# Patient Record
Sex: MALE | Race: WHITE | NOT HISPANIC OR LATINO | Employment: STUDENT | URBAN - METROPOLITAN AREA
[De-identification: names, ages, dates, MRNs, and addresses within clinical notes are randomized per-mention and may not be internally consistent; named-entity substitution may affect disease eponyms.]

---

## 2017-07-21 ENCOUNTER — HOSPITAL ENCOUNTER (EMERGENCY)
Facility: HOSPITAL | Age: 22
Discharge: HOME | End: 2017-07-21
Attending: EMERGENCY MEDICINE | Admitting: EMERGENCY MEDICINE
Payer: COMMERCIAL

## 2017-07-21 VITALS
TEMPERATURE: 97.6 F | SYSTOLIC BLOOD PRESSURE: 127 MMHG | DIASTOLIC BLOOD PRESSURE: 60 MMHG | HEART RATE: 60 BPM | RESPIRATION RATE: 16 BRPM | OXYGEN SATURATION: 98 % | WEIGHT: 147.49 LBS
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
## 2017-07-21 DIAGNOSIS — M79.10 MUSCLE SORENESS: ICD-10-CM

## 2017-07-21 PROCEDURE — 99283 EMERGENCY DEPT VISIT LOW MDM: CPT

## 2017-08-03 ENCOUNTER — ALLSCRIPTS OFFICE VISIT (OUTPATIENT)
Dept: OTHER | Facility: OTHER | Age: 22
End: 2017-08-03

## 2017-08-03 ENCOUNTER — TRANSCRIBE ORDERS (OUTPATIENT)
Dept: ADMINISTRATIVE | Facility: HOSPITAL | Age: 22
End: 2017-08-03

## 2017-08-03 DIAGNOSIS — S49.91XA UNSPECIFIED INJURY OF RIGHT SHOULDER AND UPPER ARM, INITIAL ENCOUNTER: ICD-10-CM

## 2017-08-03 DIAGNOSIS — S49.91XD INJURY OF RIGHT UPPER ARM, SUBSEQUENT ENCOUNTER: Primary | ICD-10-CM

## 2017-08-09 ENCOUNTER — HOSPITAL ENCOUNTER (OUTPATIENT)
Dept: RADIOLOGY | Facility: HOSPITAL | Age: 22
Discharge: HOME | End: 2017-08-09
Attending: INTERNAL MEDICINE
Payer: COMMERCIAL

## 2017-08-09 ENCOUNTER — HOSPITAL ENCOUNTER (OUTPATIENT)
Dept: MRI IMAGING | Facility: HOSPITAL | Age: 22
Discharge: HOME | End: 2017-08-09
Attending: INTERNAL MEDICINE
Payer: COMMERCIAL

## 2017-08-09 DIAGNOSIS — S49.91XA UNSPECIFIED INJURY OF RIGHT SHOULDER AND UPPER ARM, INITIAL ENCOUNTER: ICD-10-CM

## 2017-08-09 DIAGNOSIS — S49.91XD INJURY OF RIGHT UPPER ARM, SUBSEQUENT ENCOUNTER: ICD-10-CM

## 2017-08-09 PROCEDURE — A9585 GADOBUTROL INJECTION: HCPCS | Performed by: INTERNAL MEDICINE

## 2017-08-09 PROCEDURE — 77002 NEEDLE LOCALIZATION BY XRAY: CPT

## 2017-08-09 PROCEDURE — 23350 INJECTION FOR SHOULDER X-RAY: CPT

## 2017-08-09 PROCEDURE — 73222 MRI JOINT UPR EXTREM W/DYE: CPT

## 2017-08-09 RX ADMIN — IOHEXOL 50 ML: 300 INJECTION, SOLUTION INTRAVENOUS at 13:26

## 2017-08-09 RX ADMIN — GADOBUTROL 0.2 ML: 604.72 INJECTION INTRAVENOUS at 13:26

## 2017-08-11 ENCOUNTER — ALLSCRIPTS OFFICE VISIT (OUTPATIENT)
Dept: OTHER | Facility: OTHER | Age: 22
End: 2017-08-11

## 2017-09-07 ENCOUNTER — GENERIC CONVERSION - ENCOUNTER (OUTPATIENT)
Dept: OTHER | Facility: OTHER | Age: 22
End: 2017-09-07

## 2017-11-21 ENCOUNTER — TRANSCRIBE ORDERS (OUTPATIENT)
Dept: ADMINISTRATIVE | Facility: HOSPITAL | Age: 22
End: 2017-11-21

## 2017-11-21 ENCOUNTER — GENERIC CONVERSION - ENCOUNTER (OUTPATIENT)
Dept: OTHER | Facility: OTHER | Age: 22
End: 2017-11-21

## 2017-11-21 DIAGNOSIS — G44.309 POST-TRAUMATIC HEADACHE, NOT INTRACTABLE, UNSPECIFIED CHRONICITY PATTERN: Primary | ICD-10-CM

## 2017-12-15 ENCOUNTER — HOSPITAL ENCOUNTER (OUTPATIENT)
Dept: MRI IMAGING | Facility: HOSPITAL | Age: 22
Discharge: HOME | End: 2017-12-15
Attending: INTERNAL MEDICINE
Payer: COMMERCIAL

## 2017-12-15 ENCOUNTER — GENERIC CONVERSION - ENCOUNTER (OUTPATIENT)
Dept: OTHER | Facility: OTHER | Age: 22
End: 2017-12-15

## 2017-12-15 DIAGNOSIS — G44.309 POST-TRAUMATIC HEADACHE, NOT INTRACTABLE, UNSPECIFIED CHRONICITY PATTERN: ICD-10-CM

## 2017-12-15 PROCEDURE — 70551 MRI BRAIN STEM W/O DYE: CPT

## 2017-12-18 ENCOUNTER — GENERIC CONVERSION - ENCOUNTER (OUTPATIENT)
Dept: OTHER | Facility: OTHER | Age: 22
End: 2017-12-18

## 2017-12-18 ENCOUNTER — ALLSCRIPTS OFFICE VISIT (OUTPATIENT)
Dept: OTHER | Facility: OTHER | Age: 22
End: 2017-12-18

## 2017-12-20 ENCOUNTER — ALLSCRIPTS OFFICE VISIT (OUTPATIENT)
Dept: OTHER | Facility: OTHER | Age: 22
End: 2017-12-20
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
## 2018-01-13 VITALS
SYSTOLIC BLOOD PRESSURE: 120 MMHG | WEIGHT: 149 LBS | HEART RATE: 60 BPM | BODY MASS INDEX: 20.18 KG/M2 | DIASTOLIC BLOOD PRESSURE: 65 MMHG | HEIGHT: 72 IN

## 2018-01-13 VITALS
WEIGHT: 149.91 LBS | HEART RATE: 64 BPM | HEIGHT: 72 IN | BODY MASS INDEX: 20.31 KG/M2 | DIASTOLIC BLOOD PRESSURE: 70 MMHG | SYSTOLIC BLOOD PRESSURE: 126 MMHG
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
## 2018-01-22 VITALS
BODY MASS INDEX: 20.31 KG/M2 | SYSTOLIC BLOOD PRESSURE: 137 MMHG | HEART RATE: 75 BPM | WEIGHT: 149.91 LBS | HEIGHT: 72 IN | DIASTOLIC BLOOD PRESSURE: 76 MMHG

## 2018-01-22 VITALS
HEIGHT: 72 IN | SYSTOLIC BLOOD PRESSURE: 124 MMHG | WEIGHT: 150.25 LBS | RESPIRATION RATE: 18 BRPM | HEART RATE: 57 BPM | DIASTOLIC BLOOD PRESSURE: 73 MMHG | OXYGEN SATURATION: 98 % | BODY MASS INDEX: 20.35 KG/M2

## 2018-01-22 VITALS
SYSTOLIC BLOOD PRESSURE: 117 MMHG | WEIGHT: 149.94 LBS | HEIGHT: 72 IN | DIASTOLIC BLOOD PRESSURE: 79 MMHG | BODY MASS INDEX: 20.31 KG/M2 | HEART RATE: 91 BPM

## 2018-01-23 NOTE — MISCELLANEOUS
Message  Return to work or school [address]:   Saademerald Paz is under my professional care  He was seen in my office on 12/18/2017         Activities as tolerated  Aixa FERGUSON        Future Appointments    Signatures   Electronically signed by : Aixa Rogers MD; Dec 18 2017  3:30PM EST                       (Author)
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
## 2019-05-06 ENCOUNTER — TELEPHONE (OUTPATIENT)
Dept: NEUROLOGY | Facility: CLINIC | Age: 24
End: 2019-05-06

## 2019-06-06 ENCOUNTER — OFFICE VISIT (OUTPATIENT)
Dept: NEUROLOGY | Facility: CLINIC | Age: 24
End: 2019-06-06
Payer: COMMERCIAL

## 2019-06-06 VITALS
HEART RATE: 49 BPM | SYSTOLIC BLOOD PRESSURE: 126 MMHG | HEIGHT: 72 IN | WEIGHT: 155.2 LBS | BODY MASS INDEX: 21.02 KG/M2 | DIASTOLIC BLOOD PRESSURE: 66 MMHG

## 2019-06-06 DIAGNOSIS — G44.309 HEADACHES DUE TO OLD HEAD INJURY: Primary | ICD-10-CM

## 2019-06-06 DIAGNOSIS — G44.099 TRIGEMINAL AUTONOMIC CEPHALGIAS: ICD-10-CM

## 2019-06-06 DIAGNOSIS — S09.90XS HEADACHES DUE TO OLD HEAD INJURY: Primary | ICD-10-CM

## 2019-06-06 PROCEDURE — 99215 OFFICE O/P EST HI 40 MIN: CPT | Performed by: PSYCHIATRY & NEUROLOGY

## 2019-06-06 RX ORDER — VERAPAMIL HYDROCHLORIDE 40 MG/1
TABLET ORAL
Qty: 60 TABLET | Refills: 2 | Status: SHIPPED | OUTPATIENT
Start: 2019-06-06

## 2019-06-06 RX ORDER — SUMATRIPTAN 50 MG/1
TABLET, FILM COATED ORAL
Qty: 9 TABLET | Refills: 2 | Status: SHIPPED | OUTPATIENT
Start: 2019-06-06

## 2019-06-10 ENCOUNTER — TELEPHONE (OUTPATIENT)
Dept: NEUROLOGY | Facility: CLINIC | Age: 24
End: 2019-06-10

## 2019-06-19 ENCOUNTER — APPOINTMENT (OUTPATIENT)
Dept: LAB | Facility: CLINIC | Age: 24
End: 2019-06-19
Payer: COMMERCIAL

## 2019-06-19 DIAGNOSIS — S09.90XS HEADACHES DUE TO OLD HEAD INJURY: ICD-10-CM

## 2019-06-19 DIAGNOSIS — G44.309 HEADACHES DUE TO OLD HEAD INJURY: ICD-10-CM

## 2019-06-19 LAB
BUN SERPL-MCNC: 19 MG/DL (ref 5–25)
CREAT SERPL-MCNC: 1.04 MG/DL (ref 0.6–1.3)
GFR SERPL CREATININE-BSD FRML MDRD: 102 ML/MIN/1.73SQ M

## 2019-06-19 PROCEDURE — 84520 ASSAY OF UREA NITROGEN: CPT

## 2019-06-19 PROCEDURE — 36415 COLL VENOUS BLD VENIPUNCTURE: CPT

## 2019-06-19 PROCEDURE — 82565 ASSAY OF CREATININE: CPT

## 2019-06-26 ENCOUNTER — HOSPITAL ENCOUNTER (OUTPATIENT)
Dept: MRI IMAGING | Facility: HOSPITAL | Age: 24
Discharge: HOME | End: 2019-06-26
Attending: PSYCHIATRY & NEUROLOGY
Payer: COMMERCIAL

## 2019-06-26 DIAGNOSIS — G44.309 HEADACHES DUE TO OLD HEAD INJURY: ICD-10-CM

## 2019-06-26 DIAGNOSIS — S09.90XS HEADACHES DUE TO OLD HEAD INJURY: ICD-10-CM

## 2019-06-26 PROCEDURE — A9585 GADOBUTROL INJECTION: HCPCS | Performed by: PSYCHIATRY & NEUROLOGY

## 2019-06-26 PROCEDURE — 70553 MRI BRAIN STEM W/O & W/DYE: CPT

## 2019-06-26 RX ADMIN — GADOBUTROL 7 ML: 604.72 INJECTION INTRAVENOUS at 20:15

## 2019-07-05 ENCOUNTER — TELEPHONE (OUTPATIENT)
Dept: NEUROLOGY | Facility: CLINIC | Age: 24
End: 2019-07-05

## 2019-07-05 NOTE — TELEPHONE ENCOUNTER
Patient called in for results of MRI  Reviewed with patient the impression of "normal examination"  Patient also asking for new MyChart code to be sent  Obtained patients email address and new code sent to patient  Patient also wanted to know the status of records request  SILKE on file  Patient given [facility] phone number and SILKE re-faxed to [facility]

## 2023-05-06 ENCOUNTER — HOSPITAL ENCOUNTER (EMERGENCY)
Facility: HOSPITAL | Age: 28
Discharge: HOME | End: 2023-05-07
Attending: EMERGENCY MEDICINE

## 2023-05-06 DIAGNOSIS — S09.90XA CLOSED HEAD INJURY, INITIAL ENCOUNTER: Primary | ICD-10-CM

## 2023-05-06 DIAGNOSIS — F10.929 ALCOHOL INTOXICATION (HCC): ICD-10-CM

## 2023-05-06 DIAGNOSIS — S50.12XA CONTUSION OF LEFT FOREARM, INITIAL ENCOUNTER: ICD-10-CM

## 2023-05-06 DIAGNOSIS — S80.02XA CONTUSION OF LEFT KNEE, INITIAL ENCOUNTER: ICD-10-CM

## 2023-05-06 DIAGNOSIS — S06.0XAA CONCUSSION: ICD-10-CM

## 2023-05-07 ENCOUNTER — APPOINTMENT (EMERGENCY)
Dept: CT IMAGING | Facility: HOSPITAL | Age: 28
End: 2023-05-07

## 2023-05-07 ENCOUNTER — APPOINTMENT (EMERGENCY)
Dept: RADIOLOGY | Facility: HOSPITAL | Age: 28
End: 2023-05-07

## 2023-05-07 VITALS
BODY MASS INDEX: 22.1 KG/M2 | HEIGHT: 72 IN | OXYGEN SATURATION: 97 % | HEART RATE: 77 BPM | TEMPERATURE: 98.3 F | SYSTOLIC BLOOD PRESSURE: 128 MMHG | DIASTOLIC BLOOD PRESSURE: 70 MMHG | WEIGHT: 163.14 LBS | RESPIRATION RATE: 18 BRPM
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
## 2024-02-07 ENCOUNTER — OFFICE VISIT (OUTPATIENT)
Dept: URGENT CARE | Age: 29
End: 2024-02-07
Payer: COMMERCIAL

## 2024-02-07 VITALS
OXYGEN SATURATION: 98 % | DIASTOLIC BLOOD PRESSURE: 76 MMHG | HEART RATE: 56 BPM | SYSTOLIC BLOOD PRESSURE: 108 MMHG | RESPIRATION RATE: 18 BRPM | TEMPERATURE: 97.9 F

## 2024-02-07 DIAGNOSIS — R59.1 LYMPHADENOPATHY: Primary | ICD-10-CM

## 2024-02-07 DIAGNOSIS — R10.12 LEFT UPPER QUADRANT ABDOMINAL PAIN: ICD-10-CM

## 2024-02-07 PROCEDURE — 99213 OFFICE O/P EST LOW 20 MIN: CPT | Performed by: NURSE PRACTITIONER

## 2024-02-08 ENCOUNTER — APPOINTMENT (EMERGENCY)
Dept: CT IMAGING | Facility: HOSPITAL | Age: 29
End: 2024-02-08
Payer: COMMERCIAL

## 2024-02-08 ENCOUNTER — HOSPITAL ENCOUNTER (EMERGENCY)
Facility: HOSPITAL | Age: 29
Discharge: HOME | End: 2024-02-08
Attending: EMERGENCY MEDICINE
Payer: COMMERCIAL

## 2024-02-08 VITALS
RESPIRATION RATE: 18 BRPM | SYSTOLIC BLOOD PRESSURE: 132 MMHG | WEIGHT: 164.9 LBS | TEMPERATURE: 98.2 F | BODY MASS INDEX: 22.37 KG/M2 | OXYGEN SATURATION: 100 % | HEART RATE: 53 BPM | DIASTOLIC BLOOD PRESSURE: 61 MMHG

## 2024-02-08 DIAGNOSIS — B27.90 MONONUCLEOSIS: ICD-10-CM

## 2024-02-08 DIAGNOSIS — R53.83 FATIGUE: Primary | ICD-10-CM

## 2024-02-08 DIAGNOSIS — R16.1 SPLENOMEGALY: ICD-10-CM

## 2024-02-08 DIAGNOSIS — R10.12 LUQ ABDOMINAL PAIN: ICD-10-CM

## 2024-02-08 LAB
ALBUMIN SERPL BCP-MCNC: 4.3 G/DL (ref 3.5–5)
ALP SERPL-CCNC: 74 U/L (ref 34–104)
ALT SERPL W P-5'-P-CCNC: 56 U/L (ref 7–52)
ANION GAP SERPL CALCULATED.3IONS-SCNC: 8 MMOL/L
AST SERPL W P-5'-P-CCNC: 43 U/L (ref 13–39)
BACTERIA UR QL AUTO: NORMAL /HPF
BASOPHILS # BLD MANUAL: 0.08 THOUSAND/UL (ref 0–0.1)
BASOPHILS NFR MAR MANUAL: 1 % (ref 0–1)
BILIRUB SERPL-MCNC: 0.59 MG/DL (ref 0.2–1)
BILIRUB UR QL STRIP: NEGATIVE
BUN SERPL-MCNC: 16 MG/DL (ref 5–25)
CALCIUM SERPL-MCNC: 9.2 MG/DL (ref 8.4–10.2)
CHLORIDE SERPL-SCNC: 102 MMOL/L (ref 96–108)
CLARITY UR: CLEAR
CO2 SERPL-SCNC: 27 MMOL/L (ref 21–32)
COLOR UR: ABNORMAL
CREAT SERPL-MCNC: 0.94 MG/DL (ref 0.6–1.3)
EOSINOPHIL # BLD MANUAL: 0.24 THOUSAND/UL (ref 0–0.4)
EOSINOPHIL NFR BLD MANUAL: 3 % (ref 0–6)
ERYTHROCYTE [DISTWIDTH] IN BLOOD BY AUTOMATED COUNT: 12.3 % (ref 11.6–15.1)
FLUAV RNA RESP QL NAA+PROBE: NEGATIVE
FLUBV RNA RESP QL NAA+PROBE: NEGATIVE
GFR SERPL CREATININE-BSD FRML MDRD: 111 ML/MIN/1.73SQ M
GLUCOSE SERPL-MCNC: 86 MG/DL (ref 65–140)
GLUCOSE UR STRIP-MCNC: NEGATIVE MG/DL
HCT VFR BLD AUTO: 40.7 % (ref 36.5–49.3)
HGB BLD-MCNC: 13.8 G/DL (ref 12–17)
HGB UR QL STRIP.AUTO: NEGATIVE
KETONES UR STRIP-MCNC: ABNORMAL MG/DL
LEUKOCYTE ESTERASE UR QL STRIP: NEGATIVE
LIPASE SERPL-CCNC: 31 U/L (ref 11–82)
LYMPHOCYTES # BLD AUTO: 36 % (ref 14–44)
LYMPHOCYTES # BLD AUTO: 4.92 THOUSAND/UL (ref 0.6–4.47)
MCH RBC QN AUTO: 29.7 PG (ref 26.8–34.3)
MCHC RBC AUTO-ENTMCNC: 33.9 G/DL (ref 31.4–37.4)
MCV RBC AUTO: 88 FL (ref 82–98)
MONOCYTES # BLD AUTO: 0.24 THOUSAND/UL (ref 0–1.22)
MONOCYTES NFR BLD: 3 % (ref 4–12)
NEUTROPHILS # BLD MANUAL: 2.58 THOUSAND/UL (ref 1.85–7.62)
NEUTS SEG NFR BLD AUTO: 32 % (ref 43–75)
NITRITE UR QL STRIP: NEGATIVE
NON-SQ EPI CELLS URNS QL MICRO: NORMAL /HPF
PH UR STRIP.AUTO: 7 [PH]
PLATELET # BLD AUTO: 156 THOUSANDS/UL (ref 149–390)
PLATELET BLD QL SMEAR: ADEQUATE
PMV BLD AUTO: 9.9 FL (ref 8.9–12.7)
POTASSIUM SERPL-SCNC: 4.1 MMOL/L (ref 3.5–5.3)
PROT SERPL-MCNC: 6.8 G/DL (ref 6.4–8.4)
PROT UR STRIP-MCNC: ABNORMAL MG/DL
RBC # BLD AUTO: 4.64 MILLION/UL (ref 3.88–5.62)
RBC #/AREA URNS AUTO: NORMAL /HPF
RBC MORPH BLD: NORMAL
RSV RNA RESP QL NAA+PROBE: NEGATIVE
SARS-COV-2 RNA RESP QL NAA+PROBE: NEGATIVE
SODIUM SERPL-SCNC: 137 MMOL/L (ref 135–147)
SP GR UR STRIP.AUTO: >=1.05 (ref 1–1.03)
UROBILINOGEN UR STRIP-ACNC: <2 MG/DL
VARIANT LYMPHS # BLD AUTO: 25 %
WBC # BLD AUTO: 8.06 THOUSAND/UL (ref 4.31–10.16)
WBC #/AREA URNS AUTO: NORMAL /HPF

## 2024-02-08 PROCEDURE — 87798 DETECT AGENT NOS DNA AMP: CPT

## 2024-02-08 PROCEDURE — 0241U HB NFCT DS VIR RESP RNA 4 TRGT: CPT

## 2024-02-08 PROCEDURE — 74177 CT ABD & PELVIS W/CONTRAST: CPT

## 2024-02-08 PROCEDURE — 85007 BL SMEAR W/DIFF WBC COUNT: CPT

## 2024-02-08 PROCEDURE — 81001 URINALYSIS AUTO W/SCOPE: CPT

## 2024-02-08 PROCEDURE — G1004 CDSM NDSC: HCPCS

## 2024-02-08 PROCEDURE — 36415 COLL VENOUS BLD VENIPUNCTURE: CPT

## 2024-02-08 PROCEDURE — 99285 EMERGENCY DEPT VISIT HI MDM: CPT | Performed by: EMERGENCY MEDICINE

## 2024-02-08 PROCEDURE — 86308 HETEROPHILE ANTIBODY SCREEN: CPT

## 2024-02-08 PROCEDURE — 99284 EMERGENCY DEPT VISIT MOD MDM: CPT

## 2024-02-08 PROCEDURE — 83690 ASSAY OF LIPASE: CPT

## 2024-02-08 PROCEDURE — 85027 COMPLETE CBC AUTOMATED: CPT

## 2024-02-08 PROCEDURE — 80053 COMPREHEN METABOLIC PANEL: CPT

## 2024-02-08 RX ADMIN — IOHEXOL 100 ML: 350 INJECTION, SOLUTION INTRAVENOUS at 20:15

## 2024-02-08 NOTE — ED PROVIDER NOTES
History  Chief Complaint   Patient presents with    Fatigue     Patient presents for a week of fatigue. Has pain under his ribs bilaterally. Subjective fever on Sunday, recurrent headache.      The pt is a 28 year old male who presents to ED for evaluation of abdominal pain, fatigue. Pt states he has had 1 week of increased fatigue. He states he started to have pain in the LUQ 2 days ago He states it was initially achy but then became sharp today and is rated 7/10. He also notes some swollen lymph nodes in the right neck. Pt requests testing for mono Denies vomiting, diarrhea, difficulty urinating, SOB, chest pain, fever        Prior to Admission Medications   Prescriptions Last Dose Informant Patient Reported? Taking?   SUMAtriptan (IMITREX) 50 mg tablet   No No   Sig: Take 1 tab within 15 min when headache starts, may repeat second dose in 2 hours   Patient not taking: Reported on 2/7/2024      Facility-Administered Medications: None       Past Medical History:   Diagnosis Date    Migraine        Past Surgical History:   Procedure Laterality Date    CARDIAC SURGERY      Transposition of great vessels at 4 weeks old       Family History   Problem Relation Age of Onset    No Known Problems Mother     No Known Problems Father      I have reviewed and agree with the history as documented.    E-Cigarette/Vaping     E-Cigarette/Vaping Substances     Social History     Tobacco Use    Smoking status: Never    Smokeless tobacco: Never   Substance Use Topics    Alcohol use: Yes     Comment: socia use    Drug use: Never        Review of Systems   Constitutional:  Positive for fatigue.   HENT:  Negative for congestion, rhinorrhea and sore throat.    Respiratory:  Negative for chest tightness.    Cardiovascular:  Negative for chest pain and leg swelling.   Gastrointestinal:  Positive for abdominal pain. Negative for diarrhea and vomiting.   Genitourinary:  Negative for difficulty urinating.   Skin:  Negative for rash and  wound.   Hematological:  Positive for adenopathy.       Physical Exam  ED Triage Vitals [02/08/24 1831]   Temperature Pulse Respirations Blood Pressure SpO2   98.2 °F (36.8 °C) (!) 44 18 144/65 98 %      Temp Source Heart Rate Source Patient Position - Orthostatic VS BP Location FiO2 (%)   Oral Monitor -- -- --      Pain Score       --             Orthostatic Vital Signs  Vitals:    02/08/24 1831 02/08/24 2105   BP: 144/65 132/61   Pulse: (!) 44 (!) 53       Physical Exam  Vitals and nursing note reviewed.   Constitutional:       Appearance: Normal appearance.   HENT:      Head: Normocephalic and atraumatic.      Mouth/Throat:      Mouth: Mucous membranes are dry.   Eyes:      Conjunctiva/sclera: Conjunctivae normal.      Pupils: Pupils are equal, round, and reactive to light.   Cardiovascular:      Rate and Rhythm: Normal rate and regular rhythm.      Pulses: Normal pulses.      Heart sounds: Normal heart sounds.   Pulmonary:      Effort: Pulmonary effort is normal. No respiratory distress.      Breath sounds: Normal breath sounds. No wheezing or rales.   Chest:      Chest wall: No tenderness.   Abdominal:      General: Abdomen is flat. Bowel sounds are normal.      Palpations: Abdomen is soft. There is splenomegaly.      Tenderness: There is abdominal tenderness in the left upper quadrant. There is no right CVA tenderness, left CVA tenderness, guarding or rebound.   Musculoskeletal:         General: No deformity or signs of injury. Normal range of motion.      Right lower leg: No edema.      Left lower leg: No edema.   Lymphadenopathy:      Cervical: Cervical adenopathy present.      Right cervical: Superficial cervical adenopathy and posterior cervical adenopathy present.   Skin:     General: Skin is warm and dry.   Neurological:      General: No focal deficit present.      Mental Status: He is alert and oriented to person, place, and time.      Gait: Gait normal.   Psychiatric:         Mood and Affect: Mood  normal.         Behavior: Behavior normal.         Thought Content: Thought content normal.         Judgment: Judgment normal.         ED Medications  Medications   iohexol (OMNIPAQUE) 350 MG/ML injection (MULTI-DOSE) 100 mL (100 mL Intravenous Given 2/8/24 2015)       Diagnostic Studies  Results Reviewed       Procedure Component Value Units Date/Time    Mononucleosis screen [[id number]]  (Abnormal) Collected: 02/08/24 1914    Lab Status: Final result Specimen: Blood from Arm, Left Updated: 02/09/24 0913     Monotest Positive    Bordetella pertussis / parapertussis PCR [[id number]]  (Normal) Collected: 02/08/24 2103    Lab Status: Final result Specimen: Nasopharyngeal from Nose Updated: 02/09/24 0303    Narrative:      The following orders were created for panel order Bordetella pertussis / parapertussis PCR.  Procedure                               Abnormality         Status                     ---------                               -----------         ------                     Bordetella pertussis / p...[[id number]]  Normal              Final result                 Please view results for these tests on the individual orders.    Bordetella pertussis / parapertussis PCR [[id number]]  (Normal) Collected: 02/08/24 2103    Lab Status: Final result Specimen: Nasopharyngeal from Nose Updated: 02/09/24 0303     Bordetella Pertussis PCR Not Detected     Bordetella Parapertussis PCR Not Detected    Narrative:      This test has been performed using real-time PCR on the [employer]. This test has been FDA-cleared, validated by the , and verified by the performing laboratory.    Results are for the presumptive identification of Bordetella pertussis and/or Bordetella parapertussis DNA in the patient sample. This test is intended for use in conjunction with clinical presentation and other laboratory markers for the clinical management of Bordetella pertussis and/or Bordetella parapertussis    Positive  results are indicative of infection, but do not rule out bacterial infection or co-infection with other bacteria or viruses. Negative results do not preclude infection and should not be used as the sole basis for treatment or other patient management decisions.      Urine Microscopic [[id number]]  (Normal) Collected: 02/08/24 2116    Lab Status: Final result Specimen: Urine, Clean Catch Updated: 02/08/24 2127     RBC, UA 1-2 /hpf      WBC, UA 1-2 /hpf      Epithelial Cells None Seen /hpf      Bacteria, UA None Seen /hpf     UA w Reflex to Microscopic w Reflex to Culture [[id number]]  (Abnormal) Collected: 02/08/24 2116    Lab Status: Final result Specimen: Urine, Clean Catch Updated: 02/08/24 2127     Color, UA Light Yellow     Clarity, UA Clear     Specific Gravity, UA >=1.050     pH, UA 7.0     Leukocytes, UA Negative     Nitrite, UA Negative     Protein, UA Trace mg/dl      Glucose, UA Negative mg/dl      Ketones, UA 40 (2+) mg/dl      Urobilinogen, UA <2.0 mg/dl      Bilirubin, UA Negative     Occult Blood, UA Negative    RBC Morphology Reflex Test [[id number]] Collected: 02/08/24 1914    Lab Status: Final result Specimen: Blood from Arm, Left Updated: 02/08/24 2101    CBC and differential [[id number]]  (Normal) Collected: 02/08/24 1914    Lab Status: Final result Specimen: Blood from Arm, Left Updated: 02/08/24 2024     WBC 8.06 Thousand/uL      RBC 4.64 Million/uL      Hemoglobin 13.8 g/dL      Hematocrit 40.7 %      MCV 88 fL      MCH 29.7 pg      MCHC 33.9 g/dL      RDW 12.3 %      MPV 9.9 fL      Platelets 156 Thousands/uL     Narrative:      This is an appended report.  These results have been appended to a previously verified report.    Manual Differential(PHLEBS Do Not Order) [[id number]]  (Abnormal) Collected: 02/08/24 1914    Lab Status: Final result Specimen: Blood from Arm, Left Updated: 02/08/24 2024     Segmented % 32 %      Lymphocytes % 36 %      Monocytes % 3 %      Eosinophils, % 3 %      Basophils %  1 %      Atypical Lymphocytes % 25 %      Absolute Neutrophils 2.58 Thousand/uL      Lymphocytes Absolute 4.92 Thousand/uL      Monocytes Absolute 0.24 Thousand/uL      Eosinophils Absolute 0.24 Thousand/uL      Basophils Absolute 0.08 Thousand/uL      Total Counted --     RBC Morphology Normal     Platelet Estimate Adequate    Comprehensive metabolic panel [[id number]]  (Abnormal) Collected: 02/08/24 1914    Lab Status: Final result Specimen: Blood from Arm, Left Updated: 02/08/24 2005     Sodium 137 mmol/L      Potassium 4.1 mmol/L      Chloride 102 mmol/L      CO2 27 mmol/L      ANION GAP 8 mmol/L      BUN 16 mg/dL      Creatinine 0.94 mg/dL      Glucose 86 mg/dL      Calcium 9.2 mg/dL      AST 43 U/L      ALT 56 U/L      Alkaline Phosphatase 74 U/L      Total Protein 6.8 g/dL      Albumin 4.3 g/dL      Total Bilirubin 0.59 mg/dL      eGFR 111 ml/min/1.73sq m     Narrative:      National Kidney Disease Foundation guidelines for Chronic Kidney Disease (CKD):     Stage 1 with normal or high GFR (GFR > 90 mL/min/1.73 square meters)    Stage 2 Mild CKD (GFR = 60-89 mL/min/1.73 square meters)    Stage 3A Moderate CKD (GFR = 45-59 mL/min/1.73 square meters)    Stage 3B Moderate CKD (GFR = 30-44 mL/min/1.73 square meters)    Stage 4 Severe CKD (GFR = 15-29 mL/min/1.73 square meters)    Stage 5 End Stage CKD (GFR <15 mL/min/1.73 square meters)  Note: GFR calculation is accurate only with a steady state creatinine    Lipase [[id number]]  (Normal) Collected: 02/08/24 1914    Lab Status: Final result Specimen: Blood from Arm, Left Updated: 02/08/24 2005     Lipase 31 u/L     FLU/RSV/COVID - if FLU/RSV clinically relevant [[id number]]  (Normal) Collected: 02/08/24 1914    Lab Status: Final result Specimen: Nares from Nose Updated: 02/08/24 2005     SARS-CoV-2 Negative     INFLUENZA A PCR Negative     INFLUENZA B PCR Negative     RSV PCR Negative    Narrative:      FOR PEDIATRIC PATIENTS - copy/paste COVID Guidelines URL to  browser: [email or web address]    SARS-CoV-2 assay is a Nucleic Acid Amplification assay intended for the  qualitative detection of nucleic acid from SARS-CoV-2 in nasopharyngeal  swabs. Results are for the presumptive identification of SARS-CoV-2 RNA.    Positive results are indicative of infection with SARS-CoV-2, the virus  causing COVID-19, but do not rule out bacterial infection or co-infection  with other viruses. Laboratories within the [place] and its  territories are required to report all positive results to the appropriate  public health authorities. Negative results do not preclude SARS-CoV-2  infection and should not be used as the sole basis for treatment or other  patient management decisions. Negative results must be combined with  clinical observations, patient history, and epidemiological information.  This test has not been FDA cleared or approved.    This test has been authorized by FDA under an Emergency Use Authorization  (EUA). This test is only authorized for the duration of time the  declaration that circumstances exist justifying the authorization of the  emergency use of an in vitro diagnostic tests for detection of SARS-CoV-2  virus and/or diagnosis of COVID-19 infection under section 564(b)(1) of  the Act, 21 U.S.C. 360bbb-3(b)(1), unless the authorization is terminated  or revoked sooner. The test has been validated but independent review by FDA  and CLIA is pending.    Test performed using [employer] GeneXpert: This RT-PCR assay targets N2,  a region unique to SARS-CoV-2. A conserved region in the E-gene was chosen  for pan-Sarbecovirus detection which includes SARS-CoV-2.    According to CMS-2020-01-R, this platform meets the definition of high-throughput technology.                   CT abdomen pelvis with contrast   Final Result by Radha Llamas MD (02/08 2123)      No evidence of acute intra-abdominal or pelvic pathology.       Mild splenomegaly         Workstation performed: [id number]               Procedures  Procedures      ED Course  ED Course as of 02/10/24 0153   Thu Feb 08, 2024 2028 SARS-COV-2: Negative   2028 INFLU A PCR: Negative   2028 INFLU B PCR: Negative   2028 RSV PCR: Negative   2028 Lipase: 31  normal   2028 CBC and differential  WNL   2127 CT abdomen pelvis with contrast  IMPRESSION:  No evidence of acute intra-abdominal or pelvic pathology.  Mild splenomegaly                                       Medical Decision Making  Ddx: Mono, viral syndrome, renal stone, pyelonephritis, pancreatitis    Negative lipase, CMP with mild elevation of LFTs, other wise normal  UA negative for infection  CT abd/pelvis showed splenomegaly  Mono sent and pt informed result would not be back tonight and he will be called if positive. Advised to avoid contact sports. CBC with evaluated Lymphocytes suggestive of Mono.     Amount and/or Complexity of Data Reviewed  Labs: ordered. Decision-making details documented in ED Course.  Radiology: ordered. Decision-making details documented in ED Course.    Risk  Prescription drug management.          Disposition  Final diagnoses:   Fatigue   Splenomegaly   Suspected Mononucleosis   LUQ abdominal pain     Time reflects when diagnosis was documented in both MDM as applicable and the Disposition within this note       Time User Action Codes Description Comment    2/8/2024  9:27 PM Chen Devine Add [R53.83] Fatigue     2/8/2024  9:27 PM Chen Devine Add [R16.1] Splenomegaly     2/8/2024  9:28 PM Chen Devine Amanda Add [B27.90] Mononucleosis     2/8/2024  9:28 PM Chen Devine Amanda Modify [B27.90] Suspected Mononucleosis     2/8/2024  9:29 PM Chen Devine Amanda Add [R10.12] LUQ abdominal pain           ED Disposition       ED Disposition   Discharge    Condition   Stable    Date/Time   Thu Feb 8, 2024  9:27 PM    Comment   Myron Olson discharge to home/self care.                    Follow-up Information       Follow up With Specialties Details Why Contact Info Additional Information    [facility]  Call for an appointment to establish care with a primary care provider [address]  [address]  [facility] [address] [phone] [facility], [address], [address], [place] [address] [phone]            Discharge Medication List as of 2/8/2024  9:32 PM        CONTINUE these medications which have NOT CHANGED    Details   SUMAtriptan (IMITREX) 50 mg tablet Take 1 tab within 15 min when headache starts, may repeat second dose in 2 hours, Normal           No discharge procedures on file.    PDMP Review       None             ED Provider  Attending physically available and evaluated Myron Edwardskaden. I managed the patient along with the ED Attending.    Electronically Signed by           Chen Devine MD  02/10/24 0153

## 2024-02-08 NOTE — PROGRESS NOTES
[facility]        NAME: Myron Olson is a 28 y.o. male  : 1995    MRN: [id number]  DATE: 2024  TIME: 8:32 PM    Assessment and Plan   Lymphadenopathy [R59.1]  1. Lymphadenopathy        2. Left upper quadrant abdominal pain              Patient Instructions       Follow up with PCP  Proceed to  ER if symptoms worsen.    Chief Complaint     Chief Complaint   Patient presents with    Fatigue      Started chest pain Friday night left side which subsided on Saturday.  headache  started on on   which intermittant , Reports swollen glands on Monday right side.started with left side flank pain yesterday. Fatigue reported to have started last week.  Taking advil 400mg 2 times a day to help with headache. Last dose today @3;00pm    Chest Pain    Abdominal Pain         History of Present Illness       HPI  Reports he has swollen tonsils on the right side of the neck. Ongoing for about three days. Left side pain, X 2 days. Also some fatigue and headache.     Review of Systems   Review of Systems   Constitutional:  Positive for fatigue. Negative for chills and fever.   HENT:  Negative for congestion, ear pain, rhinorrhea and sore throat.    Respiratory:  Negative for cough, chest tightness, shortness of breath and wheezing.    Cardiovascular:  Negative for chest pain.   Gastrointestinal:  Positive for abdominal pain (left flank). Negative for blood in stool, diarrhea, nausea and vomiting.   Neurological:  Positive for headaches.         Current Medications       Current Outpatient Medications:     SUMAtriptan (IMITREX) 50 mg tablet, Take 1 tab within 15 min when headache starts, may repeat second dose in 2 hours (Patient not taking: Reported on 2024), Disp: 9 tablet, Rfl: 2    Current Allergies     Allergies as of 2024 - Reviewed 2024   Allergen Reaction Noted    Azithromycin Hives 2017            The following portions of the patient's history were reviewed and  updated as appropriate: allergies, current medications, past family history, past medical history, past social history, past surgical history and problem list.     Past Medical History:   Diagnosis Date    Migraine        Past Surgical History:   Procedure Laterality Date    CARDIAC SURGERY      Transposition of great vessels at 4 weeks old       Family History   Problem Relation Age of Onset    No Known Problems Mother     No Known Problems Father          Medications have been verified.        Objective   /76   Pulse 56   Temp 97.9 °F (36.6 °C) (Temporal)   Resp 18   SpO2 98%   No LMP for male patient.       Physical Exam     Physical Exam  Constitutional:       Appearance: He is not ill-appearing or diaphoretic.   HENT:      Right Ear: Tympanic membrane normal.      Left Ear: Tympanic membrane normal.      Nose: No rhinorrhea.      Mouth/Throat:      Pharynx: No posterior oropharyngeal erythema.   Cardiovascular:      Rate and Rhythm: Regular rhythm.      Heart sounds: Murmur heard.   Pulmonary:      Breath sounds: Normal breath sounds.   Abdominal:      General: There is no distension.      Palpations: There is no mass.      Tenderness: There is abdominal tenderness (left upper quadrant pain on palpation, more so toward the left flank). There is no guarding or rebound.   Lymphadenopathy:      Cervical: Cervical adenopathy (right sided) present.

## 2024-02-09 LAB
B PARAPERT DNA UPPER RESP QL NAA+PROBE: NOT DETECTED
B PERT DNA UPPER RESP QL NAA+PROBE: NOT DETECTED
HETEROPH AB SER QL: POSITIVE

## 2024-02-09 NOTE — DISCHARGE INSTRUCTIONS
You will be able to see your mono test tomorrow in My chart. You will receive a call if it is positive. If you are positive, you should avoid any contact sports for 6 weeks. You may have significant fatigue during that time as well.   You should call to make an appointment to establish care with a family medicine provider with in 1-2 weeks.

## 2024-02-12 NOTE — ED ATTENDING ATTESTATION
2/8/2024  I, Juana Yarbrough MD, saw and evaluated the patient. I have discussed the patient with the resident/non-physician practitioner and agree with the resident's/non-physician practitioner's findings, Plan of Care, and MDM as documented in the resident's/non-physician practitioner's note, except where noted. All available labs and Radiology studies were reviewed.  I was present for key portions of any procedure(s) performed by the resident/non-physician practitioner and I was immediately available to provide assistance.       At this point I agree with the current assessment done in the Emergency Department.  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  Over the last week he is appreciated profound fatigue as well as some headaches and noticed swollen lymph nodes on the right side of his neck.  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  He denies having had any sore throat, sensation of throat swelling, cough, vomiting, diarrhea, urinary symptoms or rashes.  No known sick contacts.  He is concerned about possible mononucleosis.    On exam he is alert and with clear speech.  Mucous membranes are moist.  Mild pallor.  Appears tired.  No icterus.  Heart sounds regular.  Bradycardic.  Lungs clear to auscultation bilaterally.  Oropharynx very minimally injected no appreciated tonsillitis.  No appreciated anterior cervical lymphadenopathy.  Small right posterior cervical lymphadenopathy noted.  No palpable supraclavicular masses/lymphadenopathy.  (Patient denies having appreciated any swelling in the inguinal region).  Abdomen is thin and soft with very minimal left  upper quadrant tenderness.  No guarding.  No CVA tenderness.    Most suspicious for viral etiology of symptoms-mononucleosis or other although absence of significant throat symptoms raises concern for other possible infection versus less likely malignant process.    ED Course     Viral panel negative.  Total white blood cell count normal.  Predominance of atypical lymphocytes.  CT scan reveals only mild splenomegaly.  No other concerning lymphadenopathy on this suggestive of process such as lymphoma.  Mononucleosis remains most likely etiology.  Reviewed supportive care for this and importance of avoiding trauma to the spleen.  He is aware to follow-up with his PCP and that results from monotest will return in the next 24 hours and [facility]    Critical Care Time  Procedures

## 2024-03-06 ENCOUNTER — OFFICE VISIT (OUTPATIENT)
Dept: FAMILY MEDICINE CLINIC | Facility: CLINIC | Age: 29
End: 2024-03-06
Payer: COMMERCIAL

## 2024-03-06 VITALS
WEIGHT: 166 LBS | BODY MASS INDEX: 22.48 KG/M2 | SYSTOLIC BLOOD PRESSURE: 98 MMHG | HEART RATE: 50 BPM | HEIGHT: 72 IN | TEMPERATURE: 98.1 F | OXYGEN SATURATION: 98 % | DIASTOLIC BLOOD PRESSURE: 58 MMHG

## 2024-03-06 DIAGNOSIS — B27.00 GAMMAHERPESVIRAL MONONUCLEOSIS WITHOUT COMPLICATION: Primary | ICD-10-CM

## 2024-03-06 PROBLEM — G44.309 HEADACHES DUE TO OLD HEAD INJURY: Status: RESOLVED | Noted: 2019-06-06 | Resolved: 2024-03-06

## 2024-03-06 PROBLEM — S09.90XS HEADACHES DUE TO OLD HEAD INJURY: Status: RESOLVED | Noted: 2019-06-06 | Resolved: 2024-03-06

## 2024-03-06 PROCEDURE — 99213 OFFICE O/P EST LOW 20 MIN: CPT | Performed by: INTERNAL MEDICINE

## 2024-03-06 NOTE — ASSESSMENT & PLAN NOTE
Patient was seen in the emergency room 2/8 and diagnosed with mono. He did do a CAT scan that showed mild enlargement of his spleen. He states all of his symptoms have gone away. Told him just to be safe keep away from sports for the next month

## 2024-03-06 NOTE — PROGRESS NOTES
Name: Myron Olson      : 1995      MRN: [id number]  Encounter Provider: Sidra Quan MD  Encounter Date: 3/6/2024   Encounter department: [facility]    Assessment & Plan     1. Gammaherpesviral mononucleosis without complication  Assessment & Plan:  Patient was seen in the emergency room  and diagnosed with mono. He did do a CAT scan that showed mild enlargement of his spleen. He states all of his symptoms have gone away    Orders:  -     CBC and differential; Future  -     Comprehensive metabolic panel; Future           Subjective      Patient went to the ER on  complaining of fatigue feverish and some pain under his ribs. He was tested for and found to be mono positive. They did a CT of his abdomen that showed a mildly are enlarged spleen. He did have atypical lymphocytes but otherwise his blood work was normal. Today he feels dramatically better      Review of Systems   Constitutional:  Negative for chills and fever.   HENT:  Negative for ear pain and sore throat.    Eyes:  Negative for pain and visual disturbance.   Respiratory:  Negative for cough and shortness of breath.    Cardiovascular:  Negative for chest pain and palpitations.   Gastrointestinal:  Negative for abdominal pain and vomiting.   Genitourinary:  Negative for dysuria and hematuria.   Musculoskeletal:  Negative for arthralgias and back pain.   Skin:  Negative for color change and rash.   Neurological:  Negative for seizures and syncope.   All other systems reviewed and are negative.      Current Outpatient Medications on File Prior to Visit   Medication Sig    SUMAtriptan (IMITREX) 50 mg tablet Take 1 tab within 15 min when headache starts, may repeat second dose in 2 hours (Patient not taking: Reported on 2024)       Objective     BP 98/58 (BP Location: Left arm, Patient Position: Sitting, Cuff Size: Standard)   Pulse (!) 50   Temp 98.1 °F (36.7 °C)   Ht 6' (1.829 m)   Wt 75.3 kg (166 lb)   SpO2  98%   BMI 22.51 kg/m²     Physical Exam  Constitutional:       General: He is not in acute distress.     Appearance: He is well-developed.   HENT:      Right Ear: External ear normal.      Left Ear: External ear normal.      Nose: Nose normal.      Mouth/Throat:      Pharynx: No oropharyngeal exudate.   Eyes:      Pupils: Pupils are equal, round, and reactive to light.   Neck:      Thyroid: No thyromegaly.      Vascular: No JVD.   Cardiovascular:      Rate and Rhythm: Normal rate and regular rhythm.      Heart sounds: Normal heart sounds. No murmur heard.     No gallop.   Pulmonary:      Effort: Pulmonary effort is normal. No respiratory distress.      Breath sounds: Normal breath sounds. No wheezing or rales.   Abdominal:      General: Bowel sounds are normal. There is no distension.      Palpations: Abdomen is soft. There is no mass.      Tenderness: There is no abdominal tenderness.      Comments: Questionable spleen tip felt   Musculoskeletal:         General: No tenderness. Normal range of motion.      Cervical back: Normal range of motion and neck supple.   Lymphadenopathy:      Cervical: No cervical adenopathy.   Skin:     Findings: No rash.   Neurological:      Mental Status: He is alert and oriented to person, place, and time.      Cranial Nerves: No cranial nerve deficit.      Coordination: Coordination normal.   Psychiatric:         Behavior: Behavior normal.         Thought Content: Thought content normal.         Judgment: Judgment normal.       Sidra Quan MD

## 2024-04-01 ENCOUNTER — OFFICE VISIT (OUTPATIENT)
Dept: FAMILY MEDICINE CLINIC | Facility: CLINIC | Age: 29
End: 2024-04-01
Payer: COMMERCIAL

## 2024-04-01 VITALS
BODY MASS INDEX: 22.24 KG/M2 | OXYGEN SATURATION: 98 % | SYSTOLIC BLOOD PRESSURE: 118 MMHG | DIASTOLIC BLOOD PRESSURE: 60 MMHG | TEMPERATURE: 98.2 F | HEART RATE: 60 BPM | WEIGHT: 164 LBS

## 2024-04-01 DIAGNOSIS — R22.0 LOCALIZED SWELLING, MASS, AND LUMP OF HEAD: Primary | ICD-10-CM

## 2024-04-01 PROCEDURE — 99213 OFFICE O/P EST LOW 20 MIN: CPT | Performed by: INTERNAL MEDICINE

## 2024-04-01 NOTE — ASSESSMENT & PLAN NOTE
Patient is felt heart lump above the angle of the jaw over the last several days he now notices that some both sides somewhat move when he opens and closes his jaw but is not tender. I told him to take some and will changes he can go see his dentist he does still have wisdom teeth

## 2024-04-01 NOTE — PROGRESS NOTES
Name: Myron Olson      : 1995      MRN: [id number]  Encounter Provider: Sidra Quan MD  Encounter Date: 2024   Encounter department: [facility]    Assessment & Plan     1. Localized swelling, mass, and lump of head  Assessment & Plan:  Patient is felt heart lump above the angle of the jaw over the last several days he now notices that some both sides somewhat move when he opens and closes his jaw but is not tender. I told him to take some and will changes he can go see his dentist he does still have wisdom teeth             Subjective      Patient had his head shaved and noticed that he has lumps in his forehead that are hard but he only really feels if he palpated his skull or if he is opening his jaw. She does have his wisdom teeth yet has no other      Review of Systems   Constitutional:  Negative for chills and fever.   HENT:  Negative for dental problem (Still has wisdom teeth), ear pain, facial swelling, hearing loss, sinus pressure, sinus pain and sore throat.    Eyes:  Negative for pain and visual disturbance.   Respiratory:  Negative for cough and shortness of breath.    Cardiovascular:  Negative for chest pain and palpitations.   Gastrointestinal:  Negative for abdominal pain and vomiting.   Genitourinary:  Negative for dysuria and hematuria.   Musculoskeletal:  Negative for arthralgias and back pain.   Skin:  Negative for color change and rash.   Neurological:  Negative for seizures and syncope.   All other systems reviewed and are negative.      Current Outpatient Medications on File Prior to Visit   Medication Sig    SUMAtriptan (IMITREX) 50 mg tablet Take 1 tab within 15 min when headache starts, may repeat second dose in 2 hours (Patient not taking: Reported on 2024)       Objective     /60 (BP Location: Left arm, Patient Position: Sitting, Cuff Size: Large)   Pulse 60   Temp 98.2 °F (36.8 °C) (Temporal)   Wt 74.4 kg (164 lb)   SpO2 98%   BMI 22.24  kg/m²     Physical Exam  Constitutional:       General: He is not in acute distress.     Appearance: Normal appearance. He is well-developed and normal weight. He is not ill-appearing.   HENT:      Head: Normocephalic.      Right Ear: Tympanic membrane and external ear normal.      Left Ear: Tympanic membrane and external ear normal.      Nose: Nose normal.      Mouth/Throat:      Mouth: Mucous membranes are moist.      Pharynx: No oropharyngeal exudate.   Eyes:      Extraocular Movements: Extraocular movements intact.      Pupils: Pupils are equal, round, and reactive to light.      Comments: Bony prominance at angle of jaw   Neck:      Thyroid: No thyromegaly.      Vascular: No JVD.   Cardiovascular:      Rate and Rhythm: Normal rate and regular rhythm.      Heart sounds: Normal heart sounds. No murmur heard.     No gallop.   Pulmonary:      Effort: Pulmonary effort is normal. No respiratory distress.      Breath sounds: Normal breath sounds. No wheezing or rales.   Abdominal:      General: Bowel sounds are normal. There is no distension.      Palpations: Abdomen is soft. There is no mass.      Tenderness: There is no abdominal tenderness.   Musculoskeletal:         General: No tenderness. Normal range of motion.      Cervical back: Normal range of motion and neck supple.   Lymphadenopathy:      Cervical: No cervical adenopathy.   Skin:     Findings: No rash.   Neurological:      Mental Status: He is alert and oriented to person, place, and time.      Cranial Nerves: No cranial nerve deficit.      Coordination: Coordination normal.   Psychiatric:         Behavior: Behavior normal.         Thought Content: Thought content normal.         Judgment: Judgment normal.       Sidra Quan MD

## 2024-05-01 PROBLEM — B27.00 GAMMAHERPESVIRAL MONONUCLEOSIS WITHOUT COMPLICATION: Status: RESOLVED | Noted: 2024-03-06 | Resolved: 2024-05-01

## 2025-02-03 ENCOUNTER — APPOINTMENT (EMERGENCY)
Dept: RADIOLOGY | Facility: HOSPITAL | Age: 30
End: 2025-02-03
Payer: COMMERCIAL

## 2025-02-03 ENCOUNTER — HOSPITAL ENCOUNTER (EMERGENCY)
Facility: HOSPITAL | Age: 30
Discharge: HOME | End: 2025-02-04
Attending: EMERGENCY MEDICINE | Admitting: EMERGENCY MEDICINE
Payer: COMMERCIAL

## 2025-02-03 VITALS
TEMPERATURE: 98.3 F | OXYGEN SATURATION: 97 % | RESPIRATION RATE: 20 BRPM | SYSTOLIC BLOOD PRESSURE: 136 MMHG | DIASTOLIC BLOOD PRESSURE: 70 MMHG | HEART RATE: 72 BPM

## 2025-02-03 DIAGNOSIS — I49.9 IRREGULAR HEART BEAT: ICD-10-CM

## 2025-02-03 DIAGNOSIS — I45.10 RBBB: Primary | ICD-10-CM

## 2025-02-03 DIAGNOSIS — I49.3 PVC (PREMATURE VENTRICULAR CONTRACTION): ICD-10-CM

## 2025-02-03 LAB
ALBUMIN SERPL BCG-MCNC: 4.7 G/DL (ref 3.5–5)
ALP SERPL-CCNC: 63 U/L (ref 34–104)
ALT SERPL W P-5'-P-CCNC: 19 U/L (ref 7–52)
ANION GAP SERPL CALCULATED.3IONS-SCNC: 6 MMOL/L (ref 4–13)
AST SERPL W P-5'-P-CCNC: 17 U/L (ref 13–39)
BASOPHILS # BLD AUTO: 0.08 THOUSANDS/ΜL (ref 0–0.1)
BASOPHILS NFR BLD AUTO: 1 % (ref 0–1)
BILIRUB SERPL-MCNC: 0.47 MG/DL (ref 0.2–1)
BUN SERPL-MCNC: 14 MG/DL (ref 5–25)
CALCIUM SERPL-MCNC: 9.6 MG/DL (ref 8.4–10.2)
CARDIAC TROPONIN I PNL SERPL HS: 4 NG/L (ref ?–50)
CHLORIDE SERPL-SCNC: 104 MMOL/L (ref 96–108)
CO2 SERPL-SCNC: 27 MMOL/L (ref 21–32)
CREAT SERPL-MCNC: 0.98 MG/DL (ref 0.6–1.3)
EOSINOPHIL # BLD AUTO: 0.25 THOUSAND/ΜL (ref 0–0.61)
EOSINOPHIL NFR BLD AUTO: 3 % (ref 0–6)
ERYTHROCYTE [DISTWIDTH] IN BLOOD BY AUTOMATED COUNT: 11.9 % (ref 11.6–15.1)
GFR SERPL CREATININE-BSD FRML MDRD: 105 ML/MIN/1.73SQ M
GLUCOSE SERPL-MCNC: 96 MG/DL (ref 65–140)
HCT VFR BLD AUTO: 44.4 % (ref 36.5–49.3)
HGB BLD-MCNC: 15.5 G/DL (ref 12–17)
IMM GRANULOCYTES # BLD AUTO: 0.02 THOUSAND/UL (ref 0–0.2)
IMM GRANULOCYTES NFR BLD AUTO: 0 % (ref 0–2)
LYMPHOCYTES # BLD AUTO: 0.91 THOUSANDS/ΜL (ref 0.6–4.47)
LYMPHOCYTES NFR BLD AUTO: 12 % (ref 14–44)
MAGNESIUM SERPL-MCNC: 2 MG/DL (ref 1.9–2.7)
MCH RBC QN AUTO: 29.7 PG (ref 26.8–34.3)
MCHC RBC AUTO-ENTMCNC: 34.9 G/DL (ref 31.4–37.4)
MCV RBC AUTO: 85 FL (ref 82–98)
MONOCYTES # BLD AUTO: 0.56 THOUSAND/ΜL (ref 0.17–1.22)
MONOCYTES NFR BLD AUTO: 7 % (ref 4–12)
NEUTROPHILS # BLD AUTO: 5.78 THOUSANDS/ΜL (ref 1.85–7.62)
NEUTS SEG NFR BLD AUTO: 77 % (ref 43–75)
NRBC BLD AUTO-RTO: 0 /100 WBCS
PLATELET # BLD AUTO: 282 THOUSANDS/UL (ref 149–390)
PMV BLD AUTO: 10.4 FL (ref 8.9–12.7)
POTASSIUM SERPL-SCNC: 3.6 MMOL/L (ref 3.5–5.3)
PROT SERPL-MCNC: 7.3 G/DL (ref 6.4–8.4)
RBC # BLD AUTO: 5.22 MILLION/UL (ref 3.88–5.62)
SODIUM SERPL-SCNC: 137 MMOL/L (ref 135–147)
TSH SERPL DL<=0.05 MIU/L-ACNC: 1.85 UIU/ML (ref 0.45–4.5)
WBC # BLD AUTO: 7.6 THOUSAND/UL (ref 4.31–10.16)

## 2025-02-03 PROCEDURE — 84443 ASSAY THYROID STIM HORMONE: CPT

## 2025-02-03 PROCEDURE — 84484 ASSAY OF TROPONIN QUANT: CPT

## 2025-02-03 PROCEDURE — 71045 X-RAY EXAM CHEST 1 VIEW: CPT

## 2025-02-03 PROCEDURE — 93005 ELECTROCARDIOGRAM TRACING: CPT

## 2025-02-03 PROCEDURE — 83735 ASSAY OF MAGNESIUM: CPT

## 2025-02-03 PROCEDURE — 36415 COLL VENOUS BLD VENIPUNCTURE: CPT

## 2025-02-03 PROCEDURE — 99285 EMERGENCY DEPT VISIT HI MDM: CPT | Performed by: EMERGENCY MEDICINE

## 2025-02-03 PROCEDURE — 99285 EMERGENCY DEPT VISIT HI MDM: CPT

## 2025-02-03 PROCEDURE — 80053 COMPREHEN METABOLIC PANEL: CPT

## 2025-02-03 PROCEDURE — 85025 COMPLETE CBC W/AUTO DIFF WBC: CPT

## 2025-02-03 NOTE — Clinical Note
Myron Wesley was seen and treated in our emergency department on 2/3/2025.    No restrictions            Diagnosis:     Myron  .    He may return on this date: 02/05/2025         If you have any questions or concerns, please don't hesitate to call.      Amadeo Washington MD    ______________________________           _______________          _______________  Hospital Representative                              Date                                Time

## 2025-02-04 LAB
ATRIAL RATE: 75 BPM
P AXIS: 52 DEGREES
PR INTERVAL: 124 MS
QRS AXIS: 128 DEGREES
QRSD INTERVAL: 162 MS
QT INTERVAL: 402 MS
QTC INTERVAL: 448 MS
T WAVE AXIS: 24 DEGREES
VENTRICULAR RATE: 75 BPM

## 2025-02-04 PROCEDURE — 93010 ELECTROCARDIOGRAM REPORT: CPT | Performed by: INTERNAL MEDICINE

## 2025-02-04 NOTE — ED ATTENDING ATTESTATION
2/3/2025  I, Camron Pepper MD, saw and evaluated the patient. I have discussed the patient with the resident/non-physician practitioner and agree with the resident's/non-physician practitioner's findings, Plan of Care, and MDM as documented in the resident's/non-physician practitioner's note, except where noted. All available labs and Radiology studies were reviewed.  I was present for key portions of any procedure(s) performed by the resident/non-physician practitioner and I was immediately available to provide assistance.       At this point I agree with the current assessment done in the Emergency Department.  I have conducted an independent evaluation of this patient a history and physical is as follows: Patient is a 29 year old male with intermittent worsening palpitations for past 1 week. No sob but feels like he has to catch his breath at times. No travel. No smoking. No fever. No N/v/D. No GI bleeding. No excess caffeine use. Denies cocaine use. Was last seen at [facility] on 4/1/24 for head mass. [facility] website checked on this patient and no Rx found. NCAT. No scleral icterus. Moist mucous membranes. Lungs clear. Heart regular. Abdomen soft and nontender. Good bowel sounds. No edema. No rash noted. DDx including but not limited to: metabolic abnormality, cardiac arrhythmia, ACS, MI,  thyroid disease, doubt PE PERC negative), anxiety, adverse reaction. I reviewed EKG. Will check labs and CXR.     ED Course         Critical Care Time  Procedures

## 2025-02-04 NOTE — ED PROVIDER NOTES
Time reflects when diagnosis was documented in both MDM as applicable and the Disposition within this note       Time User Action Codes Description Comment    2/3/2025 11:58 PM Kike Washingtonelton MENDEZ Add [I45.10] RBBB     2/3/2025 11:58 PM Amadeo Washington Add [I49.3] PVC (premature ventricular contraction)     2/3/2025 11:59 PM Amadeo Washington Add [I49.9] Irregular heart beat           ED Disposition       ED Disposition   Discharge    Condition   Stable    Date/Time   Mon Feb 3, 2025 11:58 PM    Comment   Myron Olson discharge to home/self care.                   Assessment & Plan       Medical Decision Making  29-year-old male presents with irregular pulse.  Patient well-appearing, hemodynamically stable, afebrile, no respiratory distress, ambulatory without issues.  Differential includes but not limited to arrhythmia, electrolyte abnormality, hyper/hypothyroidism  Doubtful myocarditis, endocarditis, VTE    EKG right bundle branch block with PVC, no ischemic changes, no previous for comparison.  Heart score 2.  No acute findings on chest x-ray.  Labs within normal limits.    Discussed findings with patient, symptoms may be secondary to occasional PVCs.  Referral to cardiology.  Discussed Holter monitor.  Patient is otherwise well-appearing and in no acute distress, hemodynamically stable.  Discharged home to self-care.    Amount and/or Complexity of Data Reviewed  Labs: ordered.  Radiology: ordered and independent interpretation performed.        ED Course as of 02/04/25 0506   Mon Feb 03, 2025 2325 EKG normal sinus rhythm with PVC, rate of 75, normal NM interval, wide QRS interval with right bundle branch block, QTc 448, 1 monofocal PVC, no ischemic changes or STEMI.  No previous EKG for comparison.       Medications - No data to display    ED Risk Strat Scores   HEART Risk Score      Flowsheet Row Most Recent Value   Heart Score Risk Calculator    History 0 Filed at: 02/03/2025 2326   ECG 1 Filed at: 02/03/2025 2326  "  Age 0 Filed at: 02/03/2025 2326   Risk Factors 1 Filed at: 02/03/2025 2326   Troponin 0 Filed at: 02/03/2025 2326   HEART Score 2 Filed at: 02/03/2025 2326          HEART Risk Score      Flowsheet Row Most Recent Value   Heart Score Risk Calculator    History 0 Filed at: 02/03/2025 2326   ECG 1 Filed at: 02/03/2025 2326   Age 0 Filed at: 02/03/2025 2326   Risk Factors 1 Filed at: 02/03/2025 2326   Troponin 0 Filed at: 02/03/2025 2326   HEART Score 2 Filed at: 02/03/2025 2326                        PERC Rule for PE      Flowsheet Row Most Recent Value   PERC Rule for PE    Age >=50 0 Filed at: 02/03/2025 2322   HR >=100 0 Filed at: 02/03/2025 2322   O2 Sat on room air < 95% 0 Filed at: 02/03/2025 2322   History of PE or DVT 0 Filed at: 02/03/2025 2322   Recent trauma or surgery 0 Filed at: 02/03/2025 2322   Hemoptysis 0 Filed at: 02/03/2025 2322   Exogenous estrogen 0 Filed at: 02/03/2025 2322   Unilateral leg swelling 0 Filed at: 02/03/2025 2322   PERC Rule for PE Results 0 Filed at: 02/03/2025 2322                                History of Present Illness       Chief Complaint   Patient presents with    Palpitations     Reports over the last week having feeling like \"my heart skips a beat, like it literally stops.\"  Reports takes breath away when it happens. Denies CP       Past Medical History:   Diagnosis Date    Coarctation of aorta     Migraine     Mononucleosis 02/08/2024    Transposition of great vessels     VSD (ventricular septal defect)       Past Surgical History:   Procedure Laterality Date    CARDIAC SURGERY      Transposition of great vessels at 4 weeks old      Family History   Problem Relation Age of Onset    No Known Problems Mother     No Known Problems Father       Social History     Tobacco Use    Smoking status: Never    Smokeless tobacco: Never   Vaping Use    Vaping status: Never Used   Substance Use Topics    Alcohol use: Yes     Comment: socia use    Drug use: Never      E-Cigarette/Vaping "    E-Cigarette Use Never User       E-Cigarette/Vaping Substances    Nicotine No     THC No     CBD No     Flavoring No     Other No     Unknown No       I have reviewed and agree with the history as documented.     29-year-old male with history of VSD and coarctation status post repair presents with irregular pulse.  Patient states that he has noted an irregular pausing sensation in his pulse that he feels is uncomfortable and will cause him to feel like he needs to take a deep breath.  Occur approximately once every 2 minutes over the past couple days.  Previously would occur less frequently but now bothersome.  VSD repair and coarctation repair  No other medical history.  Denies fevers, chills, chest pain, shortness of breath, nausea, vomiting, abdominal pain, exertional dyspnea, leg swelling.      Palpitations      Review of Systems   All other systems reviewed and are negative.          Objective       ED Triage Vitals [02/03/25 2109]   Temperature Pulse Blood Pressure Respirations SpO2 Patient Position - Orthostatic VS   98.3 °F (36.8 °C) 72 136/70 20 97 % Sitting      Temp Source Heart Rate Source BP Location FiO2 (%) Pain Score    Oral Monitor Right arm -- --      Vitals      Date and Time Temp Pulse SpO2 Resp BP Pain Score FACES Pain Rating User   02/03/25 2109 98.3 °F (36.8 °C) 72 97 % 20 136/70 -- --             Physical Exam  Vitals and nursing note reviewed.   Constitutional:       General: He is not in acute distress.     Appearance: Normal appearance. He is normal weight. He is not ill-appearing, toxic-appearing or diaphoretic.   HENT:      Head: Normocephalic and atraumatic.      Right Ear: External ear normal.      Left Ear: External ear normal.      Nose: Nose normal.      Mouth/Throat:      Mouth: Mucous membranes are moist.      Pharynx: Oropharynx is clear.   Eyes:      General: No scleral icterus.        Right eye: No discharge.         Left eye: No discharge.      Extraocular Movements:  Extraocular movements intact.   Neck:      Comments: No midline C/T/L/S spine tenderness, step-offs, deformities.  Full range of motion of the cervical spine without tenderness.  No JVD.   Cardiovascular:      Rate and Rhythm: Normal rate and regular rhythm.      Pulses: Normal pulses.      Heart sounds: Murmur heard.   Pulmonary:      Effort: Pulmonary effort is normal. No respiratory distress.      Breath sounds: Normal breath sounds. No stridor. No wheezing, rhonchi or rales.   Chest:      Chest wall: No tenderness.   Abdominal:      General: There is no distension.      Palpations: Abdomen is soft. There is no mass.      Tenderness: There is no abdominal tenderness. There is no right CVA tenderness, left CVA tenderness, guarding or rebound.      Hernia: No hernia is present.   Musculoskeletal:         General: No swelling, tenderness, deformity or signs of injury.      Cervical back: Normal range of motion and neck supple. No rigidity or tenderness.      Right lower leg: No edema.      Left lower leg: No edema.   Skin:     General: Skin is warm and dry.      Capillary Refill: Capillary refill takes less than 2 seconds.      Coloration: Skin is not cyanotic, jaundiced or pale.      Findings: No bruising, erythema, lesion, petechiae or rash.   Neurological:      General: No focal deficit present.      Mental Status: He is alert and oriented to person, place, and time. Mental status is at baseline.         Results Reviewed       Procedure Component Value Units Date/Time    TSH, 3rd generation with Free T4 reflex [[id number]]  (Normal) Collected: 02/03/25 2114    Lab Status: Final result Specimen: Blood from Arm, Left Updated: 02/03/25 2357     TSH 3RD GENERATON 1.849 uIU/mL     Magnesium [[id number]]  (Normal) Collected: 02/03/25 2114    Lab Status: Final result Specimen: Blood from Arm, Left Updated: 02/03/25 2355     Magnesium 2.0 mg/dL     CBC and differential [[id number]]  (Abnormal) Collected: 02/03/25 2114     Lab Status: Final result Specimen: Blood from Arm, Left Updated: 02/03/25 2210     WBC 7.60 Thousand/uL      RBC 5.22 Million/uL      Hemoglobin 15.5 g/dL      Hematocrit 44.4 %      MCV 85 fL      MCH 29.7 pg      MCHC 34.9 g/dL      RDW 11.9 %      MPV 10.4 fL      Platelets 282 Thousands/uL      nRBC 0 /100 WBCs      Segmented % 77 %      Immature Grans % 0 %      Lymphocytes % 12 %      Monocytes % 7 %      Eosinophils Relative 3 %      Basophils Relative 1 %      Absolute Neutrophils 5.78 Thousands/µL      Absolute Immature Grans 0.02 Thousand/uL      Absolute Lymphocytes 0.91 Thousands/µL      Absolute Monocytes 0.56 Thousand/µL      Eosinophils Absolute 0.25 Thousand/µL      Basophils Absolute 0.08 Thousands/µL     HS Troponin 0hr (reflex protocol) [[id number]]  (Normal) Collected: 02/03/25 2114    Lab Status: Final result Specimen: Blood from Arm, Left Updated: 02/03/25 2156     hs TnI 0hr 4 ng/L     Comprehensive metabolic panel [[id number]] Collected: 02/03/25 2114    Lab Status: Final result Specimen: Blood from Arm, Left Updated: 02/03/25 2150     Sodium 137 mmol/L      Potassium 3.6 mmol/L      Chloride 104 mmol/L      CO2 27 mmol/L      ANION GAP 6 mmol/L      BUN 14 mg/dL      Creatinine 0.98 mg/dL      Glucose 96 mg/dL      Calcium 9.6 mg/dL      AST 17 U/L      ALT 19 U/L      Alkaline Phosphatase 63 U/L      Total Protein 7.3 g/dL      Albumin 4.7 g/dL      Total Bilirubin 0.47 mg/dL      eGFR 105 ml/min/1.73sq m     Narrative:      National Kidney Disease Foundation guidelines for Chronic Kidney Disease (CKD):     Stage 1 with normal or high GFR (GFR > 90 mL/min/1.73 square meters)    Stage 2 Mild CKD (GFR = 60-89 mL/min/1.73 square meters)    Stage 3A Moderate CKD (GFR = 45-59 mL/min/1.73 square meters)    Stage 3B Moderate CKD (GFR = 30-44 mL/min/1.73 square meters)    Stage 4 Severe CKD (GFR = 15-29 mL/min/1.73 square meters)    Stage 5 End Stage CKD (GFR <15 mL/min/1.73 square meters)  Note: GFR  calculation is accurate only with a steady state creatinine            XR chest 1 view portable   ED Interpretation by Amadeo Washington MD (02/03 8565)   No acute cardiopulmonary process noted.          Procedures    ED Medication and Procedure Management   Prior to Admission Medications   Prescriptions Last Dose Informant Patient Reported? Taking?   SUMAtriptan (IMITREX) 50 mg tablet   No No   Sig: Take 1 tab within 15 min when headache starts, may repeat second dose in 2 hours   Patient not taking: Reported on 2/7/2024      Facility-Administered Medications: None     Discharge Medication List as of 2/4/2025 12:01 AM        CONTINUE these medications which have NOT CHANGED    Details   SUMAtriptan (IMITREX) 50 mg tablet Take 1 tab within 15 min when headache starts, may repeat second dose in 2 hours, Normal             ED SEPSIS DOCUMENTATION   Time reflects when diagnosis was documented in both MDM as applicable and the Disposition within this note       Time User Action Codes Description Comment    2/3/2025 11:58 PM Amadeo Washington Add [I45.10] RBBB     2/3/2025 11:58 PM Amadeo Washington Add [I49.3] PVC (premature ventricular contraction)     2/3/2025 11:59 PM Amadeo Washington Add [I49.9] Irregular heart beat                  Amadeo Washington MD  02/04/25 4518

## 2025-02-05 ENCOUNTER — TELEPHONE (OUTPATIENT)
Dept: FAMILY MEDICINE CLINIC | Facility: CLINIC | Age: 30
End: 2025-02-05

## 2025-02-05 NOTE — TELEPHONE ENCOUNTER
02/05/25 2:03 PM    Patient contacted post ED visit, first outreach attempt made. Message was left for patient to return a call to the VBI Department at [facility]: Phone [phone].    Thank you.  Vida Young MA  [facility]

## 2025-02-05 NOTE — LETTER
[facility]  [address]  [address]  [place] [address]  [phone]    Date: 02/07/25    Myron Olson  [address] [id number]    Dear Myron:                                                                                                                                Thank you for choosing [facility] emergency department for care.  Your primary care provider wants to make sure that your ongoing medical care is being addressed. If you require follow up care as a result of your emergency department visit, there are a few things the practice would like you to know.                As part of the network's continuing commitment to caring for our patients, we have added more same day appointments and have extended office hours to meet your medical needs. After hours, on-call physicians are available via your primary care provider's main office line.               We encourage you to contact our office prior to seeking treatment to discuss your symptoms with the medical staff.  Together, we can determine the correct course of action.  A majority of non-emergent conditions such as: common cold, flu-like symptoms, fevers, strains/sprains, dislocations, minor burns, cuts and animal bites can be treated at [facility] facilities. Diagnostic testing is available at some sites.               Of course, if you are experiencing a life threatening medical emergency call 911 or proceed directly to the nearest emergency room.    Your nearest [facility] facility is conveniently located at:    [address]  [place] [address]  [phone]  SKIP THE WAIT  Conveniently offered at most [facility] locations  [place] your spot online at [email or web address] or on the [facility]    Sincerely,    [facility]  Dept: [phone]

## 2025-02-06 NOTE — TELEPHONE ENCOUNTER
02/06/25 4:19 PM    Patient contacted post ED visit, second outreach attempt made. Message was left for patient to return a call to the VBI Department at [facility]: Phone [phone].    Thank you.  Vida Young MA  [facility]

## 2025-02-07 NOTE — TELEPHONE ENCOUNTER
02/07/25 3:20 PM    Patient contacted post ED visit, phone outreaches were unsuccessful and a MyChart letter has been sent to the patient as follow-up.    Thank you.  Vida Young MA  [facility]

## 2025-04-07 ENCOUNTER — OFFICE VISIT (OUTPATIENT)
Dept: FAMILY MEDICINE CLINIC | Facility: CLINIC | Age: 30
End: 2025-04-07
Payer: COMMERCIAL

## 2025-04-07 VITALS
RESPIRATION RATE: 16 BRPM | SYSTOLIC BLOOD PRESSURE: 92 MMHG | HEIGHT: 72 IN | HEART RATE: 58 BPM | BODY MASS INDEX: 22.21 KG/M2 | TEMPERATURE: 98.2 F | DIASTOLIC BLOOD PRESSURE: 54 MMHG | OXYGEN SATURATION: 99 % | WEIGHT: 164 LBS

## 2025-04-07 DIAGNOSIS — R10.9 ACUTE LEFT FLANK PAIN: ICD-10-CM

## 2025-04-07 LAB
SL AMB  POCT GLUCOSE, UA: NORMAL
SL AMB LEUKOCYTE ESTERASE,UA: NORMAL
SL AMB POCT BILIRUBIN,UA: NORMAL
SL AMB POCT BLOOD,UA: NORMAL
SL AMB POCT CLARITY,UA: CLEAR
SL AMB POCT COLOR,UA: YELLOW
SL AMB POCT KETONES,UA: NORMAL
SL AMB POCT NITRITE,UA: NORMAL
SL AMB POCT PH,UA: 6.5
SL AMB POCT SPECIFIC GRAVITY,UA: 1.01
SL AMB POCT URINE PROTEIN: NORMAL
SL AMB POCT UROBILINOGEN: NORMAL

## 2025-04-07 PROCEDURE — 99213 OFFICE O/P EST LOW 20 MIN: CPT | Performed by: INTERNAL MEDICINE

## 2025-04-07 PROCEDURE — 81003 URINALYSIS AUTO W/O SCOPE: CPT | Performed by: INTERNAL MEDICINE

## 2025-04-07 RX ORDER — AMOXICILLIN 500 MG/1
500 CAPSULE ORAL EVERY 8 HOURS SCHEDULED
Qty: 30 CAPSULE | Refills: 0 | Status: SHIPPED | OUTPATIENT
Start: 2025-04-07 | End: 2025-04-17

## 2025-04-07 RX ORDER — AMOXICILLIN 500 MG/1
500 CAPSULE ORAL EVERY 8 HOURS SCHEDULED
Qty: 30 CAPSULE | Refills: 0 | Status: SHIPPED | OUTPATIENT
Start: 2025-04-07 | End: 2025-04-07

## 2025-04-07 NOTE — PROGRESS NOTES
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  Any started feeling ill.  He with poor appetite cough and achy.  He eventually went to an urgent center and had x-rays done that did not show anything definite.  He went home and has been drinking fluids and taking Tylenol all weekend.  However he still just does not feel right.  He has mild discomfort of his lower left rib cage and his upper left abdominal area no masses specifically no splenomegaly noted      Review of Systems   Constitutional:  Positive for fatigue. Negative for chills and fever.   HENT: Negative.  Negative for ear pain and sore throat.    Eyes: Negative.  Negative for pain and visual disturbance.   Respiratory: Negative.  Negative for cough and shortness of breath.    Cardiovascular: Negative.  Negative for chest pain and palpitations.   Gastrointestinal:  Negative for abdominal distention, abdominal pain, blood in stool, diarrhea, nausea and vomiting.   Genitourinary:  Positive for difficulty urinating, dysuria, flank pain and hematuria.   Musculoskeletal:  Negative for arthralgias and back pain.   Skin:  Negative for color change and rash.   Neurological:  Negative for seizures and syncope.   Hematological: Negative.    Psychiatric/Behavioral: Negative.     All other systems reviewed and are negative.      Objective   BP 92/54 (BP Location: Left arm, Patient Position: Sitting, Cuff Size: Large)   Pulse 58   Temp 98.2 °F (36.8 °C) (Temporal)   Resp 16   Ht 6' (1.829 m)   Wt 74.4  kg (164 lb)   SpO2 99%   BMI 22.24 kg/m²      Physical Exam  Vitals and nursing note reviewed.   Constitutional:       General: He is not in acute distress.     Appearance: He is well-developed.   HENT:      Head: Normocephalic and atraumatic.   Eyes:      Conjunctiva/sclera: Conjunctivae normal.   Cardiovascular:      Rate and Rhythm: Normal rate and regular rhythm.      Heart sounds: No murmur heard.  Pulmonary:      Effort: Pulmonary effort is normal. No respiratory distress.      Breath sounds: Normal breath sounds. No wheezing, rhonchi or rales.   Chest:      Chest wall: No tenderness.   Abdominal:      General: Abdomen is flat. There is no distension.      Palpations: Abdomen is soft. There is no mass.      Tenderness: There is no abdominal tenderness.   Musculoskeletal:         General: No swelling.      Cervical back: Neck supple.      Right lower leg: No edema.      Left lower leg: No edema.   Skin:     General: Skin is warm and dry.      Capillary Refill: Capillary refill takes less than 2 seconds.      Findings: Lesion present. No rash.   Neurological:      General: No focal deficit present.      Mental Status: He is alert and oriented to person, place, and time.   Psychiatric:         Mood and Affect: Mood normal.         Behavior: Behavior normal.

## 2025-04-07 NOTE — ASSESSMENT & PLAN NOTE
Orders:    POCT urine dip auto non-scope    amoxicillin (AMOXIL) 500 mg capsule; Take 1 capsule (500 mg total) by mouth every 8 (eight) hours for 10 days

## 2025-05-15 ENCOUNTER — OFFICE VISIT (OUTPATIENT)
Dept: FAMILY MEDICINE CLINIC | Facility: CLINIC | Age: 30
End: 2025-05-15
Payer: COMMERCIAL

## 2025-05-15 VITALS
WEIGHT: 167 LBS | TEMPERATURE: 97.8 F | BODY MASS INDEX: 22.62 KG/M2 | HEART RATE: 59 BPM | OXYGEN SATURATION: 98 % | RESPIRATION RATE: 16 BRPM | HEIGHT: 72 IN | DIASTOLIC BLOOD PRESSURE: 64 MMHG | SYSTOLIC BLOOD PRESSURE: 112 MMHG

## 2025-05-15 DIAGNOSIS — J01.00 ACUTE NON-RECURRENT MAXILLARY SINUSITIS: Primary | ICD-10-CM

## 2025-05-15 PROCEDURE — 99213 OFFICE O/P EST LOW 20 MIN: CPT | Performed by: INTERNAL MEDICINE

## 2025-05-15 RX ORDER — AMOXICILLIN 500 MG/1
500 CAPSULE ORAL EVERY 8 HOURS SCHEDULED
Qty: 30 CAPSULE | Refills: 0 | Status: SHIPPED | OUTPATIENT
Start: 2025-05-15 | End: 2025-05-25

## 2025-05-15 NOTE — PROGRESS NOTES
Name: Myron Olson      : 1995      MRN: [id number]  Encounter Provider: Sidra Quan MD  Encounter Date: 5/15/2025   Encounter department: [facility]  :  Assessment & Plan  Acute non-recurrent maxillary sinusitis    Orders:    amoxicillin (AMOXIL) 500 mg capsule; Take 1 capsule (500 mg total) by mouth every 8 (eight) hours for 10 days           History of Present Illness   Sinusitis  This is a new problem. The current episode started in the past 7 days. The problem has been gradually worsening since onset. There has been no fever. His pain is at a severity of 4/10. The pain is mild. Associated symptoms include congestion, coughing, headaches, sinus pressure and a sore throat. Pertinent negatives include no chills, ear pain or shortness of breath. Past treatments include acetaminophen, oral decongestants and saline nose sprays. The treatment provided mild relief.     Review of Systems   Constitutional:  Negative for chills and fever.   HENT:  Positive for congestion, sinus pressure and sore throat. Negative for ear pain.    Eyes:  Negative for pain and visual disturbance.   Respiratory:  Positive for cough. Negative for shortness of breath.    Cardiovascular:  Negative for chest pain and palpitations.   Gastrointestinal:  Negative for abdominal pain and vomiting.   Genitourinary:  Negative for dysuria and hematuria.   Musculoskeletal:  Negative for arthralgias and back pain.   Skin:  Negative for color change and rash.   Neurological:  Positive for headaches. Negative for seizures and syncope.   All other systems reviewed and are negative.      Objective   /64 (BP Location: Right arm, Patient Position: Sitting, Cuff Size: Large)   Pulse 59   Temp 97.8 °F (36.6 °C) (Temporal)   Resp 16   Ht 6' (1.829 m)   Wt 75.8 kg (167 lb)   SpO2 98%   BMI 22.65 kg/m²      Physical Exam  Vitals reviewed.   Constitutional:       Appearance: Normal appearance.   HENT:      Head:  Normocephalic and atraumatic.      Ears:      Comments: Wax bilat     Nose: Congestion present.      Mouth/Throat:      Pharynx: Posterior oropharyngeal erythema present.     Eyes:      Extraocular Movements: Extraocular movements intact.      Pupils: Pupils are equal, round, and reactive to light.       Cardiovascular:      Rate and Rhythm: Normal rate and regular rhythm.      Pulses: Normal pulses.   Pulmonary:      Effort: Pulmonary effort is normal.      Breath sounds: Normal breath sounds.   Abdominal:      General: Abdomen is flat.      Palpations: Abdomen is soft.     Musculoskeletal:         General: Normal range of motion.     Skin:     Findings: No rash.     Neurological:      General: No focal deficit present.      Mental Status: He is alert and oriented to person, place, and time. Mental status is at baseline.